# Patient Record
(demographics unavailable — no encounter records)

---

## 2017-11-27 NOTE — CONS
DATE OF CONSULTATION:  2017

 

 

 

[facility] INITIAL OUTPATIENT CONSULTATION NOTE

 

PLACE OF SERVICE:  [facility].  

 

REFERRING PHYSICIAN:  Vaughn Currie MD.

 

REASON FOR CONSULTATION:  Hepatic mass.

 

Dear Dr. Currie:

 

Thank you very much for allowing us to participate in the care of this very 
pleasant gentleman and his wonderful family.

 

HISTORY OF PRESENT ILLNESS:  The patient is a very pleasant 78-year-old 
gentleman with comorbidity of heart valve disease status post 2 valve 
replacements in  in [facility], presenting with a rather large left-
sided hepatic mass which on his most recent MRI on 2017 from [facility] read by Dr. Danitza Thomas was found to be 11.8 cm in 
the what was described left lateral segment of the liver (segment 2, 3) 
consistent with malignancy.  The signal and enhancement characteristics were 
suspicious for fibrolamellar hepatocellular carcinoma.  An additional 1.1 cm 
lesion was present within segment 4B suspicious for a satellite lesion or 
metastatic nodule.  There was also nonspecific mildly prominent mesenteric and 
peripheral lymph nodes which may be reactive or inflammatory versus metastatic.
  My own review of these images also showed possibility of thrombus within the 
left hepatic vein without extension into the inferior vena cava.  Note that the 
patient's alpha fetoprotein was measured at 26,377.9 on 2017.  The rest 
of his tumor markers including CEA and CA 19-9 were 2.7 and 21 respectively.  
His platelet count was 160 in October and 193 on 2017.  He has had 
knowledge of this mass since summer of 2017 where he presented during a trip to 
[facility] to a clinic with symptoms of abdominal pain and nausea, vomiting.  
At that time, an ultrasound was done per patient's report and he was told the 
diagnosis and the surgeons did recommend that he undergo surgical resection of 
this area.  He decided to come to the [place] for his medical care and 
was seen around 2017 or thereabouts at [facility] per their report, 
where the mass was again diagnosed with imaging, but per family's report, there 
were no other discussions regarding resection.  Currently, his main complaints 
are abdominal pain which is in the mid upper epigastric region and some nausea, 
vomiting.  He also has some fatigue.  Per family's report, his energy level is 
lower than normal, but he is still able to ambulate and do his daily activities 
of living.  There is a 15 pound weight loss over the last 4 or 5 months.  He 
has a history of alcohol intake in the past, but his hepatitis B and C 
evaluations in 2017 were negative.

 

COMORBIDITIES:



1.  Cardiac valvular disease, status post mitral valve and aortic valve 
replacement in  at [facility].  Likely bioprosthetic since the 
patient is not on anticoagulation.  Further details are missing.

2.  Heavy alcohol intake for 5 years, last time , quit.

3.  A 15 pound weight loss over the last 4 months in late .

4.  Mild thrombocytopenia with platelet count between 150 to 200 in 2017.  

5.  On gabapentin.

6.  On metformin.

7.  On losartan.

8.  On metoclopramide.

9.  Mild renal insufficiency.

10.  Perhaps prediabetic.

 

ALLERGIES:  NO KNOWN DRUG ALLERGIES.

 

MEDICATIONS:  Carefully recorded in the electronic record system and reviewed 
by me.

 

SOCIAL HISTORY:  The patient lives with his family.  He is currently unemployed 
and not working.  He does not smoke or use intravenous drugs.  His last alcohol 
intake was in  and heavy drinking for 5 years before that.

 

FAMILY HISTORY:  There is no mention of major medical, surgical or oncologic 
problems in the family.

 

REVIEW OF SYSTEMS:  Other than the above-mentioned, there are no other 
pertinent positives or pertinent negatives in a complete 14-point review of 
systems.

 

PHYSICAL EXAMINATION:

GENERAL:  The patient appears to be a very pleasant  gentleman of 
 descent, appearing stated age, sitting in a chair comfortably and in 
no acute distress.  His BMI is 20.9.

VITAL SIGNS:  Temperature is 97.7, blood pressure 180/81, pulse 85, respiratory 
rate 16, pulse oximetry 95% on room air.

HEENT:  His head is normocephalic and atraumatic.  His extraocular muscles and 
hearing are grossly intact bilaterally and symmetrically.  His sclerae are 
nonicteric.  His oral cavity is clear, and his oral mucosa appeared to be pink 
and moist.  He has no teeth of his own.  He has temporal wasting and appears to 
be thin.

NECK:  Supple.  There is no lymphadenopathy or JVD.  There is no 
lymphadenopathy or JVD.

CHEST:  Rises symmetrically with each breath, and he is breathing comfortably.  
There are no audible wheezes, rales or rhonchi in the gross exam.  His carotid 
pulses are palpable bilaterally and symmetrically in his neck.

HEART:  His pulse is palpable on the left wrist.  His lower extremities contain 
no pitting edema around the ankles bilaterally and symmetrically.  He has a well
-healed mid sternal wound as well as lower chest midline wounds corresponding 
to perhaps chest tubes.

ABDOMEN:  Soft, nondistended and mildly tender over a slight bulge which 
appears to be in the upper midline and to the left corresponding to the known 
liver mass.  There is no evidence of caput medusae, engorged subcutaneous veins 
or ascites.  The abdomen is otherwise without any peritoneal signs or guarding.

SKIN:  Appears to be pink and feels warm to touch.

NEUROLOGIC:  He is awake, alert and follows commands appropriately.

 

LABORATORY VALUES:  Other than above-mentioned, there are no significant other 
major labs.  Albumin was 4.3 on the check from 10/2017 and it was again 
measured at 4.2.  Creatinine was 1.28 and his glucose was 308.  

 

IMAGING:  Pertinent findings on the images from CT scan and MRIs were reviewed 
above.  Note that I personally reviewed all these images and I agree in general 
with their overall reported findings.

 

ASSESSMENT AND PLAN:  A very pleasant 78-year-old gentleman with a few 
comorbidities including heart disease, status post valve replacement in , 
as well as slight thrombocytopenia and liver images that may indicate early 
cirrhosis, although there is no significant other findings of this issue and 
the patient has had negative hepatitis screens without significant recent 
alcohol intake, presenting with a mass in the liver left lobe which appears to 
be large and consistent with hepatocellular carcinoma.  Given the elevated 
alpha fetoprotein as well as the thrombus within the left hepatic vein, I am a 
bit concerned that this process is more than what we see on the images.  However
, the patient is sufficiently suitable for an operative exploration and his 
only chance of increased survival is essentially ability to remove this mass 
with an R0 resection.  Local therapy to the liver is also possible in the form 
of transarterial chemoembolization, which we will institute if surgery is not 
possible.  Chemotherapy in this setting is usually not very effective and I 
will leave that decision in the capable hands of oncology colleagues discussion 
with the patient and family.  Finally, transplant is not an option in this 
gentleman's case because of the size of the tumor and presence of tumor 
thrombus within the left hepatic vein.  I had a long discussion with the 
patient and his daughter today and I explained all the above in detail 
including the operation as well as the risks, benefits and alternatives.  I 
gave them approximately a 60% chance that this operation would be completed.  
Our plan at this point is to start laparoscopically with intraoperative 
ultrasound of the liver and perhaps doing biopsies and if there are no other 
contraindications to perhaps go on with a left hepatic lobectomy.  This may be 
a complex operation if there is involvement of tumor thrombus within the left 
hepatic vein and this also increases the chances of pulmonary embolism and 
other problems with the heart if the operation is carried on to its fullest 
extent.  It would be important to obtain a cardiac echocardiogram and have 
perhaps a cardiology evaluation before this major operation.  I am also 
concerned about the patient's overall physical state and this certainly 
increases the probability of problems after the operation.  However, I do 
believe that there is enough indication for operative intervention in an 
expedited fashion and if the surgery is not possible to then consider other 
types of therapies to the liver including transarterial chemoembolization.  
Occasionally, we also do this operation for symptoms and the patient certainly 
has the symptoms to qualify for palliative-type of resection and we will 
explore this option in the operating room.

 

With above assessment I have recommended the followin.  Preoperative history and physical with cardiac echo as well as possible 
need for cardiology consultation.

2.  Schedule the patient for a laparoscopic, possible open exploration with 
intraoperative ultrasound of the liver, possible biopsy and possible right 
hepatic lobectomy.

 

Please note that the above should be done in an expedited fashion given the 
length of time that the patient has had the diagnosis and the overall clinical 
appearance of the mass.

 

Thank you again for allowing us to participate in the care of this very 
pleasant gentleman and his wonderful family.  If there are any questions, 
please feel free to contact me at [phone].  

 

NATURE OF PRESENTING PROBLEM:  High risk.

 

COMPLEXITY OF DECISION MAKING:  High complex.

 

 

Dictated By: TAHIRA GARNER/ANGELA

DD:    2017 10:50:06

DT:    2017 12:28:34

Conf#: [id number]

DID#:  [id number]

CC: Vaughn Currie; AFUA AGUDELO MD;*EndCC*

MTDD